# Patient Record
(demographics unavailable — no encounter records)

---

## 2025-01-07 NOTE — PLAN
[FreeTextEntry1] : The patient is a 66 year old F who presents to the office for AWV & preoperative clearance. No further medical workup needed prior to procedure; THE PATIENT IS MEDICALLY OPTIMIZED.

## 2025-01-07 NOTE — ASSESSMENT
[FreeTextEntry1] : The patient is a 66 year old F who presents to the office for an annual wellness examination - Fasting labs to screen for anemia, electrolyte disturbances, DM, lipid disorders, and additional metabolic disorders - EKG: sinus bradycardia; asymptomatic  - PHQ2 performed: 0 points - Immunizations: UTD - Encouraged routine dental, vision, dermatology screenings & age-appropriate physical activity - Colonoscopy screening: UTD - Mammogram: UTD - Pap smear: UTD - DEXA: UTD    Where applicable: - Labs drawn in office. - Appropriate medication renewal(s) provided. - Provided scripts for necessary imaging and/or referrals.     Further management to be completed pending lab results and/or imaging studies. All of the patient's questions and concerns were answered in detail.   ****************************************************

## 2025-01-07 NOTE — HISTORY OF PRESENT ILLNESS
[FreeTextEntry1] : ZIYAD [de-identified] : The patient is a 64 year old F who presents to the office for annual wellness examination.  She has no acute concerns at this time  Needs medical clearance for b/l ptosis repair; elective. Procedure scheduled on 2/4/24  Routine Health maintenance (as applicable) Mammogram: 4/2024 Pap Smear: 2024 DEXA (65+): 3/2024  Colonoscopy (45+): 2014. Now Due. scheduled for 3/2025  Annual derm Eval: Q 3 months; h/o melanoma (2005) Annual eye exam: 2021. Now due  Dental examination: Q 6 months.   COVID vaccine series: completed 10/2024 Flu: completed 10/2024 Tdap (19-64): 2023 Shingles (60+):  now due  PCV (>66yo/other conditions): due   Weight: 154 lbs Physical activity: [employer] Smoking status: none

## 2025-01-07 NOTE — COUNSELING
[Fall prevention counseling provided] : Fall prevention counseling provided [Behavioral health counseling provided] : Behavioral health counseling provided [AUDIT-C Screening administered and reviewed] : AUDIT-C Screening administered and reviewed [Good understanding] : Patient has a good understanding of lifestyle changes and steps needed to achieve self management goal

## 2025-01-07 NOTE — HEALTH RISK ASSESSMENT
[Good] : ~his/her~  mood as  good [Yes] : Yes [2 - 4 times a month (2 pts)] : 2-4 times a month (2 points) [1 or 2 (0 pts)] : 1 or 2 (0 points) [Never (0 pts)] : Never (0 points) [No] : In the past 12 months have you used drugs other than those required for medical reasons? No [No falls in past year] : Patient reported no falls in the past year [0] : 2) Feeling down, depressed, or hopeless: Not at all (0) [Patient reported mammogram was normal] : Patient reported mammogram was normal [Patient reported PAP Smear was normal] : Patient reported PAP Smear was normal [Patient reported bone density results were normal] : Patient reported bone density results were normal [Patient reported colonoscopy was normal] : Patient reported colonoscopy was normal [HIV test declined] : HIV test declined [Hepatitis C test declined] : Hepatitis C test declined [# of Members in Household ___] :  household currently consist of [unfilled] member(s) [Retired] : retired [] :  [Seat Belt] :  uses seat belt [Sunscreen] : uses sunscreen [PHQ-2 Negative - No further assessment needed] : PHQ-2 Negative - No further assessment needed [Never] : Never [de-identified] : No [de-identified] : Dermatologist Dr. Pena, Cardiologist Dr. Lange, GYN Dr. Bray, Dentist, Eye doctor  [Audit-CScore] : 2 [de-identified] : Walking, exercising a few times a weeks  [de-identified] : Healthy [[id number]] : 0 [de-identified] : No [Reports changes in hearing] : Reports no changes in hearing [Reports changes in vision] : Reports no changes in vision [Reports changes in dental health] : Reports no changes in dental health [Smoke Detector] : no smoke detector [MammogramDate] : 04/24 [PapSmearDate] : 03/24 [BoneDensityDate] : 03/24 [ColonoscopyDate] : 01/14 [ColonoscopyComments] : Patient has an appointment for colonoscopy [de-identified] :